# Patient Record
Sex: FEMALE | Race: OTHER | HISPANIC OR LATINO
[De-identification: names, ages, dates, MRNs, and addresses within clinical notes are randomized per-mention and may not be internally consistent; named-entity substitution may affect disease eponyms.]

---

## 2018-07-05 ENCOUNTER — TRANSCRIPTION ENCOUNTER (OUTPATIENT)
Age: 23
End: 2018-07-05

## 2018-10-10 ENCOUNTER — TRANSCRIPTION ENCOUNTER (OUTPATIENT)
Age: 23
End: 2018-10-10

## 2020-06-28 ENCOUNTER — TRANSCRIPTION ENCOUNTER (OUTPATIENT)
Age: 25
End: 2020-06-28

## 2020-07-10 PROBLEM — Z00.00 ENCOUNTER FOR PREVENTIVE HEALTH EXAMINATION: Status: ACTIVE | Noted: 2020-07-10

## 2020-07-16 ENCOUNTER — APPOINTMENT (OUTPATIENT)
Dept: THORACIC SURGERY | Facility: CLINIC | Age: 25
End: 2020-07-16
Payer: COMMERCIAL

## 2020-07-16 ENCOUNTER — OUTPATIENT (OUTPATIENT)
Dept: OUTPATIENT SERVICES | Facility: HOSPITAL | Age: 25
LOS: 1 days | End: 2020-07-16
Payer: COMMERCIAL

## 2020-07-16 PROCEDURE — 71046 X-RAY EXAM CHEST 2 VIEWS: CPT | Mod: 26

## 2020-07-16 PROCEDURE — 71046 X-RAY EXAM CHEST 2 VIEWS: CPT

## 2020-07-16 PROCEDURE — 99204 OFFICE O/P NEW MOD 45 MIN: CPT

## 2020-07-16 NOTE — PHYSICAL EXAM
[General Appearance - In No Acute Distress] : in no acute distress [General Appearance - Alert] : alert [Extraocular Movements] : extraocular movements were intact [Outer Ear] : the ears and nose were normal in appearance [General Appearance - Well Developed] : well developed [General Appearance - Well Nourished] : well nourished [Neck Appearance] : the appearance of the neck was normal [Hearing Threshold Finger Rub Not [place]] : hearing was normal [] : no respiratory distress [Exaggerated Use Of Accessory Muscles For Inspiration] : no accessory muscle use [Auscultation Breath Sounds / Voice Sounds] : lungs were clear to auscultation bilaterally [Heart Sounds] : normal S1 and S2 [Apical Impulse] : the apical impulse was normal [Examination Of The Chest] : the chest was normal in appearance [2+] : left 2+ [Cervical Lymph Nodes Enlarged Anterior Bilaterally] : anterior cervical [Cervical Lymph Nodes Enlarged Posterior Bilaterally] : posterior cervical [Abdomen Tenderness] : non-tender [Abdomen Soft] : soft [Abnormal Walk] : normal gait [No CVA Tenderness] : no ~M costovertebral angle tenderness [Skin Color & Pigmentation] : normal skin color and pigmentation [No Focal Deficits] : no focal deficits [Oriented To Time, Place, And Person] : oriented to person, place, and time [Skin Turgor] : normal skin turgor

## 2020-07-20 NOTE — ASSESSMENT
[FreeTextEntry1] : 25 year old female, with pmhx of anemia, pseudomediastinum in 2010 and 2013, self referred for evaluation of shortness of breath, chest pressure. She reports possible COVID infection in March and since then experience short episodes of dyspnea, palpitations throughout the day. She denies SOB, chest pain, dysphagia, sensation air in neck/throat. Per patient, tested COVID PCR negative last week, but tested positive for COVID antibodies. \par \par Explained to patient lung damage from a recent COVID infection may be contributing to her shortness of breath. She reports father passed away 4 years ago from a heart attack related to ITP, blood clots. Will order CT Chest with contrast to rule out PE and PFTs to evaluate lung function. Pending results, may refer her to pulmonologist for medical management. \par \par Reviewed patient's CXR today. CXR normal with no signs of pneumomediastinum, pleural effusion, pneumothorax. \par \par I have reviewed the patient's medical records and diagnostic images at the time of this office consultation and have made the following recommendation.\par Plan:\par 1. CT Chest PE Protocol\par 2. PFTs

## 2020-07-20 NOTE — HISTORY OF PRESENT ILLNESS
[FreeTextEntry1] : 25 year old female, with pmhx of anemia, pseudomediastinum in 2010 and 2013, self referred for evaluation of shortness of breath, chest pressure. She reports feeling sick in March with possible COVID for 2-3 days. Since then endorses intermittent episodes of tachycardia, shortness of breath. She recently tested COVID negative, but positive for COVID antibodies. She reports SOB, tachycardia did not feel similar to previous pseudomediastinum. She was evaluated by PCP who told her physical exam, lab work, EKG, workup was normal.

## 2020-07-22 ENCOUNTER — TRANSCRIPTION ENCOUNTER (OUTPATIENT)
Age: 25
End: 2020-07-22

## 2020-07-28 ENCOUNTER — TRANSCRIPTION ENCOUNTER (OUTPATIENT)
Age: 25
End: 2020-07-28

## 2020-08-03 ENCOUNTER — LABORATORY RESULT (OUTPATIENT)
Age: 25
End: 2020-08-03

## 2020-08-06 ENCOUNTER — OUTPATIENT (OUTPATIENT)
Dept: OUTPATIENT SERVICES | Facility: HOSPITAL | Age: 25
LOS: 1 days | End: 2020-08-06
Payer: COMMERCIAL

## 2020-08-06 DIAGNOSIS — R06.02 SHORTNESS OF BREATH: ICD-10-CM

## 2020-08-06 PROCEDURE — 94726 PLETHYSMOGRAPHY LUNG VOLUMES: CPT

## 2020-08-06 PROCEDURE — 94010 BREATHING CAPACITY TEST: CPT | Mod: 26

## 2020-08-06 PROCEDURE — 94729 DIFFUSING CAPACITY: CPT

## 2020-08-06 PROCEDURE — 94729 DIFFUSING CAPACITY: CPT | Mod: 26

## 2020-08-06 PROCEDURE — 94760 N-INVAS EAR/PLS OXIMETRY 1: CPT

## 2020-08-06 PROCEDURE — 94060 EVALUATION OF WHEEZING: CPT

## 2020-08-06 PROCEDURE — 94726 PLETHYSMOGRAPHY LUNG VOLUMES: CPT | Mod: 26

## 2020-08-28 ENCOUNTER — APPOINTMENT (OUTPATIENT)
Dept: CT IMAGING | Facility: HOSPITAL | Age: 25
End: 2020-08-28
Payer: COMMERCIAL

## 2020-08-28 ENCOUNTER — OUTPATIENT (OUTPATIENT)
Dept: OUTPATIENT SERVICES | Facility: HOSPITAL | Age: 25
LOS: 1 days | End: 2020-08-28
Payer: COMMERCIAL

## 2020-08-28 PROCEDURE — 71275 CT ANGIOGRAPHY CHEST: CPT

## 2020-08-28 PROCEDURE — 71275 CT ANGIOGRAPHY CHEST: CPT | Mod: 26

## 2020-09-01 PROBLEM — R06.02 SHORTNESS OF BREATH: Status: ACTIVE | Noted: 2020-07-16

## 2020-09-03 ENCOUNTER — APPOINTMENT (OUTPATIENT)
Dept: THORACIC SURGERY | Facility: CLINIC | Age: 25
End: 2020-09-03
Payer: COMMERCIAL

## 2020-09-03 DIAGNOSIS — J98.2 INTERSTITIAL EMPHYSEMA: ICD-10-CM

## 2020-09-03 DIAGNOSIS — R06.02 SHORTNESS OF BREATH: ICD-10-CM

## 2020-09-03 PROCEDURE — 99213 OFFICE O/P EST LOW 20 MIN: CPT | Mod: 95

## 2020-09-04 NOTE — ASSESSMENT
[FreeTextEntry1] : 25 year old female, with pmhx of anemia, pseudomediastinum in 2010 and 2013, self referred for evaluation of shortness of breath, chest pressure. She reports feeling sick in March with possible COVID for 2-3 days. Since then endorses intermittent episodes of tachycardia, shortness of breath. She recently tested COVID negative, but positive for COVID antibodies. She presents today to review CT chest and PFTs.\par \par Today the patient reports feeling improved. She denies pain, fever, chills, fever, and SOB.  CTA chest completed on 8/28/20: was reviewed which revealed, no pulmonary artery embolism and No acute pulmonary pathology. PFT done on 8/28/20 were reviewed which showed FEV1 =2.77 , 87% of predicted value, FVC =3.30 ,89 % of predicted value, PEF = 6.33, 92% of predicted value and DLCO =15.8 ,62 % of predicted value. I discussed that the DLCO is low for someone her age. I recommend she undergo evaluation with a pulmonologist. Will refer patient to Rafaela Hollins.\par \par PLAN:\par 1. Refer to pulmonologist-: Dr. Hollins \par \par

## 2020-09-04 NOTE — HISTORY OF PRESENT ILLNESS
[FreeTextEntry1] : 25 year old female, with pmhx of anemia, pseudomediastinum in 2010 and 2013, self referred for evaluation of shortness of breath, chest pressure. She reports feeling sick in March with possible COVID for 2-3 days. Since then endorses intermittent episodes of tachycardia, shortness of breath. She recently tested COVID negative, but positive for COVID antibodies. She reports SOB, tachycardia did not feel similar to previous pseudomediastinum. She presents today to review CT chest and PFTs.\par \par CTA chest completed on 8/28/20:\par - No pulmonary artery embolism.\par - No acute pulmonary pathology.\par \par PFT done on 8/28/20 showed FEV1 =2.77 , 87% of predicted value, FVC =3.30 ,89 % of predicted value, PEF = 6.33, 92% of predicted value and DLCO =15.8 ,62 % of predicted value.\par \par \par

## 2020-09-04 NOTE — END OF VISIT
[FreeTextEntry3] : I, GUY LUZ , am scribing for and in the presence of MANJIT CHRISTIAN the following sections: history of present illness, past medical/family/surgical/family/social history, review of systems, vital signs, physical exam, and disposition.\par \par

## 2020-09-09 ENCOUNTER — TRANSCRIPTION ENCOUNTER (OUTPATIENT)
Age: 25
End: 2020-09-09

## 2020-10-21 ENCOUNTER — APPOINTMENT (OUTPATIENT)
Dept: OBGYN | Facility: CLINIC | Age: 25
End: 2020-10-21

## 2020-11-12 ENCOUNTER — APPOINTMENT (OUTPATIENT)
Dept: PULMONOLOGY | Facility: CLINIC | Age: 25
End: 2020-11-12
Payer: COMMERCIAL

## 2020-11-12 VITALS
HEART RATE: 72 BPM | HEIGHT: 63.5 IN | WEIGHT: 122 LBS | OXYGEN SATURATION: 98 % | BODY MASS INDEX: 21.35 KG/M2 | TEMPERATURE: 98.1 F | DIASTOLIC BLOOD PRESSURE: 71 MMHG | SYSTOLIC BLOOD PRESSURE: 105 MMHG

## 2020-11-12 DIAGNOSIS — Z23 ENCOUNTER FOR IMMUNIZATION: ICD-10-CM

## 2020-11-12 DIAGNOSIS — R94.2 ABNORMAL RESULTS OF PULMONARY FUNCTION STUDIES: ICD-10-CM

## 2020-11-12 PROCEDURE — 99204 OFFICE O/P NEW MOD 45 MIN: CPT | Mod: 25

## 2020-11-12 PROCEDURE — G0008: CPT

## 2020-11-12 PROCEDURE — 99072 ADDL SUPL MATRL&STAF TM PHE: CPT

## 2020-11-12 PROCEDURE — 36415 COLL VENOUS BLD VENIPUNCTURE: CPT

## 2020-11-12 PROCEDURE — 90686 IIV4 VACC NO PRSV 0.5 ML IM: CPT

## 2020-11-12 NOTE — HISTORY OF PRESENT ILLNESS
[TextBox_4] : 11/12/2020: Asked to evaluate patient by Dr Crawley for low DLCO. Sick in March w fatigue, dyspnea, subj fever, cough, sputum, chest pain, presumed Covid, no testing or care at that time, just recovered at her mother's home and subsequently had pos Covid Abs. Did recover and get back to normal, though it took awhile to feel like she could get enough air in, and she had some lingering chest pain. Saw Dr Crawley in July because of these lingering symptoms because it was so scary, she was afraid she was going to die. She was having tingling in her extremities. Since that time these symptoms have basically resolved. She's back to her normal activities without dyspnea and has even started running again. No lung disease prior to Covid. No smoking or any exposures. Works in visual merchandising, [employer]. Has been borderline anemic.\par

## 2020-11-12 NOTE — CONSULT LETTER
[Dear  ___] : Dear  [unfilled], [( Thank you for referring [unfilled] for consultation for _____ )] : Thank you for referring [unfilled] for consultation for [unfilled] [Please see my note below.] : Please see my note below. [Consult Closing:] : Thank you very much for allowing me to participate in the care of this patient.  If you have any questions, please do not hesitate to contact me. [Sincerely,] : Sincerely, [FreeTextEntry2] : Bebo Crawley MD\par [address]\[place] [address]\par  [FreeTextEntry3] : Rafaela Hollins MD, FCCP\par

## 2020-11-12 NOTE — ASSESSMENT
[FreeTextEntry1] : Data reviewed:\par \par CTA chest [facility] 8/2020 personally reviewed : no PE, normal lungs\par \par PFT 8/28/2020 : normal wilman and volumes, mildly reduced DLCO 62%\par \par Impression:\par Isolated low DLCO in a healthy young woman with Covid in March\par \par Plan:\par On clinical grounds I suspect nothing is wrong with her, as she reports running 60 blocks. However, I will check a CBC for anemia, and get an echo to exclude PVD.

## 2020-11-13 LAB
BASOPHILS # BLD AUTO: 0.03 K/UL
BASOPHILS NFR BLD AUTO: 0.5 %
EOSINOPHIL # BLD AUTO: 0.02 K/UL
EOSINOPHIL NFR BLD AUTO: 0.3 %
HCT VFR BLD CALC: 34.6 %
HGB BLD-MCNC: 10.4 G/DL
IMM GRANULOCYTES NFR BLD AUTO: 0.2 %
LYMPHOCYTES # BLD AUTO: 2.19 K/UL
LYMPHOCYTES NFR BLD AUTO: 35.5 %
MAN DIFF?: NORMAL
MCHC RBC-ENTMCNC: 21.3 PG
MCHC RBC-ENTMCNC: 30.1 GM/DL
MCV RBC AUTO: 70.8 FL
MONOCYTES # BLD AUTO: 0.35 K/UL
MONOCYTES NFR BLD AUTO: 5.7 %
NEUTROPHILS # BLD AUTO: 3.57 K/UL
NEUTROPHILS NFR BLD AUTO: 57.8 %
PLATELET # BLD AUTO: 391 K/UL
RBC # BLD: 4.89 M/UL
RBC # FLD: 17.8 %
WBC # FLD AUTO: 6.17 K/UL

## 2021-05-01 ENCOUNTER — TRANSCRIPTION ENCOUNTER (OUTPATIENT)
Age: 26
End: 2021-05-01

## 2023-09-20 ENCOUNTER — APPOINTMENT (OUTPATIENT)
Dept: UROLOGY | Facility: CLINIC | Age: 28
End: 2023-09-20
Payer: COMMERCIAL

## 2023-09-20 VITALS
BODY MASS INDEX: 24.1 KG/M2 | OXYGEN SATURATION: 99 % | DIASTOLIC BLOOD PRESSURE: 74 MMHG | WEIGHT: 136 LBS | HEIGHT: 63 IN | SYSTOLIC BLOOD PRESSURE: 110 MMHG | HEART RATE: 76 BPM | TEMPERATURE: 98 F

## 2023-09-20 DIAGNOSIS — R35.0 FREQUENCY OF MICTURITION: ICD-10-CM

## 2023-09-20 DIAGNOSIS — M62.89 OTHER SPECIFIED DISORDERS OF MUSCLE: ICD-10-CM

## 2023-09-20 LAB
BILIRUB UR QL STRIP: NORMAL
CLARITY UR: CLEAR
COLLECTION METHOD: NORMAL
GLUCOSE UR-MCNC: NORMAL
HCG UR QL: 0.2 EU/DL
HGB UR QL STRIP.AUTO: NORMAL
KETONES UR-MCNC: NORMAL
LEUKOCYTE ESTERASE UR QL STRIP: ABNORMAL
NITRITE UR QL STRIP: NORMAL
PH UR STRIP: 7
PROT UR STRIP-MCNC: NORMAL
SP GR UR STRIP: 1.02

## 2023-09-20 PROCEDURE — 51798 US URINE CAPACITY MEASURE: CPT

## 2023-09-20 PROCEDURE — 81003 URINALYSIS AUTO W/O SCOPE: CPT | Mod: QW

## 2023-09-20 PROCEDURE — 99204 OFFICE O/P NEW MOD 45 MIN: CPT

## 2023-09-20 RX ORDER — VIBEGRON 75 MG/1
75 TABLET, FILM COATED ORAL
Qty: 30 | Refills: 5 | Status: ACTIVE | COMMUNITY
Start: 2023-09-20 | End: 1900-01-01

## 2023-09-21 LAB
APPEARANCE: ABNORMAL
BACTERIA: ABNORMAL /HPF
BILIRUBIN URINE: NEGATIVE
BLOOD URINE: NEGATIVE
CAST: 0 /LPF
COLOR: YELLOW
EPITHELIAL CELLS: 25 /HPF
GLUCOSE QUALITATIVE U: NEGATIVE MG/DL
KETONES URINE: NEGATIVE MG/DL
LEUKOCYTE ESTERASE URINE: ABNORMAL
MICROSCOPIC-UA: NORMAL
NITRITE URINE: NEGATIVE
PH URINE: 7.5
PROTEIN URINE: NORMAL MG/DL
RED BLOOD CELLS URINE: NORMAL /HPF
REVIEW: NORMAL
SPECIFIC GRAVITY URINE: 1.02
UROBILINOGEN URINE: 1 MG/DL
WHITE BLOOD CELLS URINE: 0 /HPF

## 2023-09-26 LAB — BACTERIA UR CULT: NORMAL

## 2023-10-23 ENCOUNTER — APPOINTMENT (OUTPATIENT)
Dept: UROLOGY | Facility: CLINIC | Age: 28
End: 2023-10-23

## 2024-04-15 ENCOUNTER — NON-APPOINTMENT (OUTPATIENT)
Age: 29
End: 2024-04-15

## 2024-07-31 ENCOUNTER — APPOINTMENT (OUTPATIENT)
Age: 29
End: 2024-07-31